# Patient Record
Sex: FEMALE | Race: WHITE | NOT HISPANIC OR LATINO | Employment: OTHER | URBAN - METROPOLITAN AREA
[De-identification: names, ages, dates, MRNs, and addresses within clinical notes are randomized per-mention and may not be internally consistent; named-entity substitution may affect disease eponyms.]

---

## 2023-08-15 ENCOUNTER — TRANSFERRED RECORDS (OUTPATIENT)
Dept: HEALTH INFORMATION MANAGEMENT | Facility: CLINIC | Age: 73
End: 2023-08-15

## 2023-08-15 LAB
ALT SERPL-CCNC: 17 U/L (ref 9–41)
AST SERPL-CCNC: 19 U/L (ref 12–38)
CREATININE (EXTERNAL): 0.8 MG/DL (ref 0.7–1.4)
GFR ESTIMATED (EXTERNAL): >60 ML/MIN/1.73M2
GLUCOSE (EXTERNAL): 99 MG/DL (ref 64–112)
POTASSIUM (EXTERNAL): 4.5 MMOL/L (ref 3.5–5.3)

## 2023-08-24 DIAGNOSIS — G47.33 OSA (OBSTRUCTIVE SLEEP APNEA): Primary | ICD-10-CM

## 2023-08-26 ENCOUNTER — DOCUMENTATION ONLY (OUTPATIENT)
Dept: PULMONOLOGY | Facility: OTHER | Age: 73
End: 2023-08-26

## 2023-08-27 NOTE — PROGRESS NOTES
SLEEP HISTORY QUESTIONNAIRE    Please describe the main reason for your sleep appointment?  complains about my snoring/ don't feel like I get a good nights sleep    How long has this been a problem? 10years    Have you been diagnosed with a sleep problem in the past? NO    If so, what?     What treatment was recommended?     Have you had a sleep study in the past? At home O2 monitoring    If yes, where and when? [facility] years ago    Sleep Habits:   Do you read in bed? Yes  Do you eat in bed? No  Do you watch TV in bed? No  Do you work in bed? No  Do you use a phone or computer in bed? No    Is you sleep disturbed by:   Bed partner: Yes  Children: No  Noise: Yes   Pets: No  Other:       On two or more nights per week, do you drink alcohol to help you fall asleep?NO    On two or more nights per week, do you take melatonin to help you fall asleep? YES    On two or more nights per week, do you take over the counter medicine to fall asleep?  YES    Do you take drinks with caffeine (coffee, tea, soda, energy drinks)? YES    Do you have 3 or more caffeine drinks in a day? NO    Do you have caffeine drinks within 6 hours of bedtime? NO    Do you smoke or use tobacco? NO    Do you exercise? YES    Sleep Routine:   Using a 24 Hour Clock    What time do you usually get into bed on workdays? 10pm    Weekend/non work days? 10pm    What time do you get out of bed on workdays? 5:30am      Weekend/non work days?5:30am    Do you work the evening or night shift or do your shifts rotate? NO    How long does it usually take to fall to sleep? 10mins    How many times do you wake during the night? 2-3    How much time do you feel that you are awake during the entire night? 1hr    How long does it take for you to fall back to sleep after you wake up? 20mins    Why do you think you wake up? Go to the bathroom    What do you do when you wake up? Get up have coffee    How much sleep do you think you get on work nights?  6-7hrs    How much sleep do you think you get on weekends/non work days? 6-7hrs    How much sleep do you think you need to feel your best? 8-9hrs    How many days during a week do you take a nap on average? 4    What is the average length of your naps? 1-2    Do you feel better after taking a nap? YES    If you could chose the best sleep schedule for you, what time would you go to bed? 9pm  What time would you get up? 6am    Do you read in bed? YES    Do you eat in bed? NO    Do you watch TV in bed? NO    Do you do work in bed? NO    Do you use a computer or phone in bed? NO    Sleep Disruptions?   Leg movements:  Do you ever have restless, crawling, aching or other unusual feelings in your legs? YES    Do you ever wake yourself by kicking your legs during the night? YES    Are the sheets and blankets messed up or tossed about when you get up? YES    Night-time behaviors:   Do you have nightmares or night terrors? NO   How often?     Have you had times when you were sleep walking? NO    Have you been seen doing anything unusual while you sleep at nights? YES  What? whimper  How often? 1 per week    Have you ever hurt yourself or someone else while you were sleeping? NO  Please describe:     Do you clench or grind your teeth during the night? YES    Sleep Apnea (pauses in breathing during sleep):  Do you wake with a headache in the morning? YES  How often? Rarely    Does your bed partner, family or friends ever say that you snore? YES  How many nights per week do you snore? 7  Can snoring be heard outside the bedroom? YES    Do you ever wake yourself up from snoring, gasping or choking? YES    Have you ever been told that you stop breathing or have pauses in your breathing? NO    Do you wake in the morning with a dry throat or mouth? YES    Do you have trouble breathing through your nose? YES    Do you have problems with heartburn, reflux or a hiatal hernia? YES    Which positions do you usually sleep in? (stomach,  back, sides, all) all    Do you use oxygen or any other medical equipment when you sleep? NO    Do members of your family (related by blood) snore? YES    Have any members of your family been diagnosed with with sleep apnea? YES    Do other members of your family have restless leg? NO    Do other members of your family have sleep walking? NO    Have you ever had an accident, or near accident due to sleepiness while driving? NO    Does your sleepiness affect your work on the job or at school? NO    Do you ever fall asleep by accident while doing a task? NO    Have you had sudden muscle weakness when laughing, angry or surprised? NO    Have you ever been unable to move your body when falling asleep or waking up? NO    Do you ever have trouble  your dreams from real life events? NO  Please describe:     Physical Health: (including illness and injury): During the past 30 days, on how many days was your physical health not good? 0/30 days     Mental Health: (including stress, depression, and problems with emotions): During the last 30 days, how may days was your mental health not good? 0/30 days.     During the past 30 days, on how many days did poor physical or mental health keep you from doing your usual activities? This might be self-care, work, or play? 0/30 days.     Social History:   Marital status:     Who lives in your home with you?     Mother (alive or dead)? dead If has , from what? Heart disease/ stroke  Father (alive or dead)? dead If has , from what? Heart attack    Siblings: YES  Have any ? NO  If so, from what?     Currently working? NO  If yes, work:   Former jobs: /CPA     Sleepiness Scale:   Sitting and reading 2   Watching TV 2   Sitting in a public place 1   Riding in a car 2   Lying down to rest in the afternoon 3   Sitting and talking to someone 0   Sitting quietly after a lunch without alcohol 1   In a car, stopping for a few minutes in traffic 0        Surgical History: No past surgical history on file.    Medical Conditions: No past medical history on file.    Medications:   No current outpatient medications on file.     No current facility-administered medications for this visit.       Are you currently having any of the following symptoms?   General:   Obvious weight gain or loss NO  Fever, chills or sweats NO  Drug allergies: Zythromax    Eyes:   Changes in vision NO  Blind spots NO  Double vision NO  Other Cataracts/macular degeneration    Ear, Nose and Throat:   Ear pain NO  Sore throat NO  Sinus pain NO  Post-nasal drip YES  Runny nose NO  Bloody nose NO    Heart:   Rapid or irregular heart beat NO  Chest pain or pressure NO  Out of breath when lying down NO  Swelling in feet or legs NO  High blood pressure NO  Heart disease NO    Nervous system   Headaches NO  Weakness in arms or legs NO  Numbness in arms of legs NO  Other:     Skin  Rashes NO  New moles or skin changes NO  Other     Lungs  Shortness of breath at rest NO  Shortness of breath with activity NO  Dry cough NO  Coughing up mucous or phlegm NO  Coughing up blood NO  Wheezing when breathing NO    Lymph System  Swollen lymph nodes NO  New lumps or bumps NO  Changes in breasts or discharge NO    Digestive System   Nausea or vomiting NO  Loose or watery stools NO  Hard, dry stools (constipation) NO  Fat or grease in stools NO  Blood in stools NO  Stools are black or bloody NO  Abdominal (belly) pain NO    Urinary Tract   Pain when you urinate (pee) NO  Blood in your urine NO  Urinate (pee) more than normal NO  Irregular periods NO    Muscles and bones   Muscle pain NO  Joint or bone pain YES  Swollen joints YES  Other     Glands  Increased thirst or urination NO  Diabetes NO  Morning glucose:   Afternoon glucose:     Mental Health  Depression NO  Anxiety NO  Other mental health issues:

## 2023-08-27 NOTE — PROGRESS NOTES
[facility]       Name: Andreia Dominique MRN# [id number]   Age: 73 year old YOB: 1950     Stop Bang questionnaire completed with a score of >3 to allow for HST     Have you been told you snore loudly (louder than talking or loud enough to be heard through doors)? YES    Do you often feel tired, fatigued, or sleepy during the daytime? YES    Has anyone observed you stop breathing during your sleep? NO    Do you have or are you being treated for high blood pressure? NO    Is your BMI greater than 35? NO    Is your neck size circumference 16 inches or greater? NO    Are you over 50 years old? YES    Stop Bang Score (# of yes): 3

## 2023-08-31 NOTE — PROGRESS NOTES
"Chart review prior to sleep testing.    Patient Summary:  73 year old yo female who is referred for sleep-disordered breathing.    There are no problems to display for this patient.      No current outpatient medications on file.     No current facility-administered medications for this visit.       Pertinent PMHx of GERD, macular degeneration, RLS.    BMI ~33.4    Does take iron supplement, 45mg twice weekly.    STOP-BANG score of 3, with unknown neck circumference.  [place] score of 11.  BMI of There is no height or weight on file to calculate BMI.     Per questionnaire: \" complains about my snoring/ don't feel like I get a good nights sleep \"    Caffeine use:  No for 3+ per day.  No for within 6 hours of bed.    Tobacco use: No    Sleep pattern:  10pm - 5:30am, total sleep time 6-7 hours.  Time to fall asleep: ~10 minutes.  Awakenings: 2-3 times per night, 20 minutes to return to sleep, awake for total of 1 hours per night.  Napping.  4 days per week, 1-2 hours per nap.    Yes for RLS screen.  No for sleep walking.  No for dream enactment behavior.  Yes for bruxism.    Yes for morning headaches.  Yes for snoring.  Yes for observed apnea.  Yes for FHx of MARIA DEL ROSARIO.    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    A/P:    1.)  High likelihood of MARIA DEL ROSARIO with STOP-BANG score of 3-4.   - Would appear to be candidate for either home sleep testing or in-lab PSG.    ---  This note was written with the assistance of the Dragon voice-dictation technology software. The final document, although reviewed, may contain errors. For corrections, please contact the office.    Braxton Mackey MD    Sleep Medicine  [facility]  - [place], [place]  Main Office: [phone]  [facility] [address], [address]  Schedule visits: [phone]  Main Office: [phone]  Fax: " [phone]

## 2023-09-13 NOTE — PROGRESS NOTES
"Andreia Dominique is a 73 year old female who is being evaluated via a billable telephone visit.       What phone number would you like to be contacted at?PHONE@ [phone]  Home Phone [phone]   Work Phone Not on file.   Mobile [phone]       How would you like to obtain your AVS? [employer]       Telephone Virtual Visit Details     Type of service:  Telephone Virtual Visit     Originating Location (pt. Location): Home     Distant Location (provider location):  Off-site, [facility] [place]      Start Time:  3:45pm  End Time:  4:05pm    Virtual visit for sleep-disordered breathing.     A/P:     1.)  High likelihood of MARIA DEL ROSARIO with STOP-BANG score of 3-4.   - Would appear to be candidate for either home sleep testing or in-lab PSG.    Given the limited timeframe to complete testing with her leaving for [place] in the beginning of October, we agreed to proceed with a noxturnal T3 home sleep test.  Orders placed today.    SUBJECTIVE:  Andreia Dominique is a 73 year old female.    73 year old yo female who is referred for sleep-disordered breathing.     Pertinent PMHx of GERD, macular degeneration, RLS.     BMI ~33.4     Does take iron supplement, 45mg twice weekly.     STOP-BANG score of 3, with unknown neck circumference.  Wallace score of 11.  BMI of There is no height or weight on file to calculate BMI.      Today -we reviewed her sleep history more detail today.  She does note feeling some mild daytime fatigue and decrease in sleep quality, though does not seem to specifically find this highly disruptive.  Main concerns were her  noting the snoring and may be some stopping breathing.    They will be leaving for the winter to live in [place] in the first week of October.    Per questionnaire: \" complains about my snoring/ don't feel like I get a good nights sleep \"     Caffeine use:  No for 3+ per day.  No for within 6 hours of bed.     Tobacco use: No     Sleep pattern:  10pm - 5:30am, " total sleep time 6-7 hours.  Time to fall asleep: ~10 minutes.  Awakenings: 2-3 times per night, 20 minutes to return to sleep, awake for total of 1 hours per night.  Napping.  4 days per week, 1-2 hours per nap.     Yes for RLS screen.  No for sleep walking.  No for dream enactment behavior.  Yes for bruxism.     Yes for morning headaches.  Yes for snoring.  Yes for observed apnea.  Yes for FHx of MARIA DEL ROSARIO.     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      Past medical history:    There are no problems to display for this patient.      10 point ROS of systems including Constitutional, Eyes, Respiratory, Cardiovascular, Gastroenterology, Genitourinary, Integumentary, Muscularskeletal, Psychiatric were all negative except for pertinent positives noted in my HPI.    No current outpatient medications on file.       OBJECTIVE:  There were no vitals taken for this visit.    Physical Exam:  healthy, alert, and no distress  PSYCH: Alert and oriented times 3; coherent speech, normal   rate and volume, able to articulate logical thoughts, able   to abstract reason, no tangential thoughts, no hallucinations   or delusions  His affect is normal  RESP: No cough, no audible wheezing, able to talk in full sentences  Remainder of exam unable to be completed due to telephone visits    ---  This note was written with the assistance of the Dragon voice-dictation technology software. The final document, although reviewed, may contain errors. For corrections, please contact the office.    Total time spent preparing to see the patient, review of chart, obtaining history and physical examination, review of sleep testing, review of treatment options, education, discussion with patient and documenting in Epic / EMR was 25 minutes.  All time involved was spent on the day of service for the patient (the same day as the patient's appointment).    Braxton Mackey MD    Sleep Medicine  [facility]  -  [place], [place]  Main Office: [phone]  [facility], [facility] - [facility]  [address] [place] [address], [address]  Schedule visits: [phone]  Main Office: [phone]  Fax: [phone]

## 2023-09-14 ENCOUNTER — VIRTUAL VISIT (OUTPATIENT)
Dept: PULMONOLOGY | Facility: OTHER | Age: 73
End: 2023-09-14
Attending: FAMILY MEDICINE
Payer: COMMERCIAL

## 2023-09-14 VITALS — HEIGHT: 65 IN | BODY MASS INDEX: 31.65 KG/M2 | WEIGHT: 190 LBS

## 2023-09-14 DIAGNOSIS — R06.83 SNORING: Primary | ICD-10-CM

## 2023-09-14 DIAGNOSIS — R06.81 APNEA: ICD-10-CM

## 2023-09-14 DIAGNOSIS — R53.82 CHRONIC FATIGUE: ICD-10-CM

## 2023-09-14 PROCEDURE — 99203 OFFICE O/P NEW LOW 30 MIN: CPT | Mod: 95 | Performed by: FAMILY MEDICINE

## 2023-09-14 ASSESSMENT — PAIN SCALES - GENERAL: PAINLEVEL: NO PAIN (0)

## 2023-10-03 ENCOUNTER — OFFICE VISIT (OUTPATIENT)
Dept: SLEEP MEDICINE | Facility: HOSPITAL | Age: 73
End: 2023-10-03
Attending: FAMILY MEDICINE
Payer: COMMERCIAL

## 2023-10-03 DIAGNOSIS — G47.33 OSA (OBSTRUCTIVE SLEEP APNEA): Primary | ICD-10-CM

## 2023-10-04 ENCOUNTER — DOCUMENTATION ONLY (OUTPATIENT)
Dept: SLEEP MEDICINE | Facility: HOSPITAL | Age: 73
End: 2023-10-04
Attending: FAMILY MEDICINE
Payer: MEDICARE

## 2023-10-04 DIAGNOSIS — R06.83 SNORING: ICD-10-CM

## 2023-10-04 DIAGNOSIS — R06.81 APNEA: ICD-10-CM

## 2023-10-04 DIAGNOSIS — R53.82 CHRONIC FATIGUE: ICD-10-CM

## 2023-10-04 PROCEDURE — G0399 HOME SLEEP TEST/TYPE 3 PORTA: HCPCS | Mod: 26 | Performed by: FAMILY MEDICINE

## 2023-10-04 PROCEDURE — G0399 HOME SLEEP TEST/TYPE 3 PORTA: HCPCS

## 2023-10-04 NOTE — PROGRESS NOTES
Pt is completing a home sleep test. Pt was instructed on how to put on the Noxturnal T3 device and associated equipment before going to bed and given the opportunity to practice putting it on before leaving the sleep center. Pt was reminded to bring the home sleep test kit back to the center tomorrow, at agreed upon time for download and reporting.   Neck circumference:    CM /  inches.

## 2023-10-05 ENCOUNTER — VIRTUAL VISIT (OUTPATIENT)
Dept: PULMONOLOGY | Facility: OTHER | Age: 73
End: 2023-10-05
Attending: FAMILY MEDICINE
Payer: COMMERCIAL

## 2023-10-05 VITALS — HEIGHT: 65 IN | BODY MASS INDEX: 31.65 KG/M2 | WEIGHT: 190 LBS

## 2023-10-05 DIAGNOSIS — G47.33 OSA (OBSTRUCTIVE SLEEP APNEA): Primary | ICD-10-CM

## 2023-10-05 PROCEDURE — 99213 OFFICE O/P EST LOW 20 MIN: CPT | Mod: 95 | Performed by: FAMILY MEDICINE

## 2023-10-05 RX ORDER — ROSUVASTATIN CALCIUM 5 MG/1
TABLET, COATED ORAL
COMMUNITY
Start: 2023-08-15

## 2023-10-05 ASSESSMENT — PAIN SCALES - GENERAL: PAINLEVEL: NO PAIN (0)

## 2023-10-05 NOTE — NURSING NOTE
Patient confirms medications and allergies are accurate via patients echeck in completion, and or denies any changes since last reviewed/verified.       Elysia Mann, Virtual Facilitator  Has patient had flu shot for current/most recent flu season? If so, when? No  Is the patient currently in the state of [place]? YES    Visit mode:TELEPHONE    If the visit is dropped, the patient can be reconnected by: TELEPHONE VISIT: Phone number:   Telephone Information:   Mobile [phone]       Will anyone else be joining the visit? NO  (If patient encounters technical issues they should call [phone]    How would you like to obtain your AVS? MyChart    Are changes needed to the allergy or medication list? Please add vit d 500 mg daily    Reason for visit: RECHECK    Elysia Mann VVF

## 2023-10-05 NOTE — PROGRESS NOTES
Virtual Visit Details    Type of service:  Telephone Visit   Phone call duration: 22 minutes       Anrdeia Dominique is a 73 year old female who is being evaluated via a billable telephone visit.       What phone number would you like to be contacted at?PHONE@ [phone]  Home Phone [phone]   Work Phone Not on file.   Mobile [phone]       How would you like to obtain your AVS? MineWhat       Telephone Virtual Visit Details     Type of service:  Telephone Virtual Visit     Originating Location (pt. Location): Home     Distant Location (provider location):  Off-site, [facility] [place]      Start Time:  3:30pm  End Time:  3:52pm    Virtual visit for review of noxturnal T3 home sleep test results.     A/P:     1.)  Mild obstructive sleep apnea (AHI 11.5) without sleep associated hypoxemia    Primary presenting concern of socially disruptive snoring and less so for daytime fatigue.  We discussed treatment options of CPAP, oral appliances (dentist made and self fit), positional therapy, weight management.    She would like to start with trial of a self-fit mandibular advancement device.  I will send some common options via MineWhat and can consider sleep dental referral or CPAP in the future.    SUBJECTIVE:  Andreia Dominique is a 73 year old female.    73 year old yo female who is referred for sleep-disordered breathing.      Pertinent PMHx of GERD, macular degeneration, RLS.     BMI ~33.4     Does take iron supplement, 45mg twice weekly.     STOP-BANG score of 3, with unknown neck circumference.  Boissev[place] score of 11.  BMI of There is no height or weight on file to calculate BMI.      9/14/2023 -we reviewed her sleep history more detail today.  She does note feeling some mild daytime fatigue and decrease in sleep quality, though does not seem to specifically find this highly disruptive.  Main concerns were her  noting the snoring and may be some stopping breathing.     They will be leaving  "for the winter to live in [place] in the first week of October.     Per questionnaire: \" complains about my snoring/ don't feel like I get a good nights sleep \"    A/P for noxturnal T3 HST.    Today - We reviewed her home sleep test results in detail.    HOME SLEEP STUDY INTERPRETATION           Patient: Andreia Dominique  MRN: [id number]  YOB: 1950  Study Date: 10/4/2023  PCP/Referring Provider: Taisha Sheets  Ordering Provider:   Braxton Mackey MD, MD           Indications for Home Study: Andreia Dominique is a 73 year old female with a history of GERD, macular degeneration, RLS  who presents with symptoms suggestive of obstructive sleep apnea.     Estimated body mass index is 31.62 kg/m  as calculated from the following:    Height as of 9/14/23: 1.651 m (5' 5\").    Weight as of 9/14/23: 86.2 kg (190 lb).  [place] Sleepiness Scale: 11/24  STOP-BANG: 3-4/8           Data: A full night home sleep study was performed recording the standard physiologic parameters including body position, movement, sound, nasal pressure, thermal oral airflow, chest and abdominal movements with respiratory inductance plethysmography, and oxygen saturation by pulse oximetry. Pulse rate was estimated by oximetry recording. This study was considered adequate based on > 4 hours of quality oximetry and respiratory recording. As specified by the AASM Manual for the Scoring of Sleep and Associated events, version 2.3, Rule VIII.D 1B, 4% oxygen desaturation scoring for hypopneas is used as a standard of care on all home sleep apnea testing.           Analysis Time:  514.2 minutes           Respiration:   Sleep Associated Hypoxemia: sustained hypoxemia was not present. Average oxygen saturation was 92.6%.  Time with saturation less than or equal to 88% was 0.5 minutes. The lowest oxygen saturation was 86%.   Snoring: Snoring was present.  Respiratory events: The home study revealed a presence of 52 obstructive apneas and 5 " mixed and central apneas. There were 33 hypopneas resulting in a combined apnea/hypopnea index [AHI] of 11.5 events per hour.  AHI was 25.4 per hour supine, - per hour prone, 9.2 per hour on left side, and 9.9 per hour on right side.   Pattern: Excluding events noted above, respiratory rate and pattern was Normal.        Position: Percent of time spent: supine - 11%, prone - 0%, on left - 41.8%, on right - 38.7%.        Heart Rate: By pulse oximetry normal rate was noted.         Assessment:   Mild obstructive sleep apnea.  Sleep associated hypoxemia was not present.     Recommendations:  Consider auto-CPAP at 5-15 cmH2O, oral appliance therapy, or polysomnography with full night PAP titration.  Suggest optimizing sleep hygiene and avoiding sleep deprivation.  Weight management.           Diagnosis Code(s): Obstructive Sleep Apnea G47.33     Braxton Mackey MD, MD, October 5, 2023   Diplomate, American Board of Family Medicine, Sleep Medicine    Past medical history:    There are no problems to display for this patient.      10 point ROS of systems including Constitutional, Eyes, Respiratory, Cardiovascular, Gastroenterology, Genitourinary, Integumentary, Muscularskeletal, Psychiatric were all negative except for pertinent positives noted in my HPI.    No current outpatient medications on file.       OBJECTIVE:  There were no vitals taken for this visit.    Physical Exam:  healthy, alert, and no distress  PSYCH: Alert and oriented times 3; coherent speech, normal   rate and volume, able to articulate logical thoughts, able   to abstract reason, no tangential thoughts, no hallucinations   or delusions  His affect is normal  RESP: No cough, no audible wheezing, able to talk in full sentences  Remainder of exam unable to be completed due to telephone visits    ---  This note was written with the assistance of the Dragon voice-dictation technology software. The final document, although reviewed, may contain  errors. For corrections, please contact the office.    Total time spent in conversation with patient on the phone today was 23 minutes.    Braxton Mackey MD    Sleep Medicine  [facility]  - [place], [place]  Main Office: [phone]  [facility] [address], [address]  Schedule visits: [phone]  Main Office: [phone]  Fax: [phone]

## 2023-10-05 NOTE — PROCEDURES
"HOME SLEEP STUDY INTERPRETATION        Patient: Andreia Dominique  MRN: [id number]  YOB: 1950  Study Date: 10/4/2023  PCP/Referring Provider: Taisha Sheets  Ordering Provider:   Braxton Mackey MD, MD         Indications for Home Study: Andreia Dominique is a 73 year old female with a history of GERD, macular degeneration, RLS  who presents with symptoms suggestive of obstructive sleep apnea.    Estimated body mass index is 31.62 kg/m  as calculated from the following:    Height as of 9/14/23: 1.651 m (5' 5\").    Weight as of 9/14/23: 86.2 kg (190 lb).  Woodruff Sleepiness Scale: 11/24  STOP-BANG: 3-4/8        Data: A full night home sleep study was performed recording the standard physiologic parameters including body position, movement, sound, nasal pressure, thermal oral airflow, chest and abdominal movements with respiratory inductance plethysmography, and oxygen saturation by pulse oximetry. Pulse rate was estimated by oximetry recording. This study was considered adequate based on > 4 hours of quality oximetry and respiratory recording. As specified by the AASM Manual for the Scoring of Sleep and Associated events, version 2.3, Rule VIII.D 1B, 4% oxygen desaturation scoring for hypopneas is used as a standard of care on all home sleep apnea testing.        Analysis Time:  514.2 minutes        Respiration:   Sleep Associated Hypoxemia: sustained hypoxemia was not present. Average oxygen saturation was 92.6%.  Time with saturation less than or equal to 88% was 0.5 minutes. The lowest oxygen saturation was 86%.   Snoring: Snoring was present.  Respiratory events: The home study revealed a presence of 52 obstructive apneas and 5 mixed and central apneas. There were 33 hypopneas resulting in a combined apnea/hypopnea index [AHI] of 11.5 events per hour.  AHI was 25.4 per hour supine, - per hour prone, 9.2 per hour on left side, and 9.9 per hour on right side.   Pattern: Excluding events noted above, " respiratory rate and pattern was Normal.      Position: Percent of time spent: supine - 11%, prone - 0%, on left - 41.8%, on right - 38.7%.      Heart Rate: By pulse oximetry normal rate was noted.       Assessment:   Mild obstructive sleep apnea.  Sleep associated hypoxemia was not present.    Recommendations:  Consider auto-CPAP at 5-15 cmH2O, oral appliance therapy, or polysomnography with full night PAP titration.  Suggest optimizing sleep hygiene and avoiding sleep deprivation.  Weight management.        Diagnosis Code(s): Obstructive Sleep Apnea G47.33    Braxton Mackey MD, MD, October 5, 2023   Diplomate, American [employer] of [employer], Sleep Medicine

## 2023-10-05 NOTE — PROGRESS NOTES
This HSAT was performed using a Noxturnal T3 device which recorded snore, sound, movement activity, body position, nasal pressure, oronasal thermal airflow, pulse, oximetry and both chest and abdominal respiratory effort. HSAT data was restricted to the time patient states they were in bed.     HSAT was scored using 1B 4% hypopnea rule.     HST AHI (Non-PAT): 11.5    Snoring was reported as moderate and loud.  Time with SpO2 below 89% was 1.9 minutes.   Overall signal quality was good     Pt will follow up with sleep provider to determine appropriate therapy.

## 2023-10-06 NOTE — PATIENT INSTRUCTIONS
Vincent Boswell,    Here are two self-fit mouth guards for snoring that I have had patients have success with in the past.    1.)  [employer] Sleep  [email or web address]    2.)  [employer]  [email or web address]    Braxton Mackey MD    Sleep Medicine  [place], [place]  Main Office: [phone]  [facility], [facility] [address], [address]  Schedule visits: [phone]  Main Office: [phone]  Fax: [phone]

## 2023-10-22 ENCOUNTER — HEALTH MAINTENANCE LETTER (OUTPATIENT)
Age: 73
End: 2023-10-22

## 2024-12-15 ENCOUNTER — HEALTH MAINTENANCE LETTER (OUTPATIENT)
Age: 74
End: 2024-12-15